# Patient Record
Sex: FEMALE | Race: WHITE | NOT HISPANIC OR LATINO | Employment: OTHER | URBAN - METROPOLITAN AREA
[De-identification: names, ages, dates, MRNs, and addresses within clinical notes are randomized per-mention and may not be internally consistent; named-entity substitution may affect disease eponyms.]

---

## 2019-05-24 PROBLEM — K50.90 CROHN DISEASE: Status: ACTIVE | Noted: 2019-04-04

## 2019-05-24 PROBLEM — E03.9 HYPOTHYROIDISM, ADULT: Status: ACTIVE | Noted: 2017-10-20

## 2019-05-24 PROBLEM — D64.9 ANEMIA: Status: ACTIVE | Noted: 2019-05-24

## 2019-05-24 PROBLEM — I10 HYPERTENSIVE DISORDER: Status: ACTIVE | Noted: 2019-05-24

## 2019-05-24 PROBLEM — R00.0 TACHYCARDIA: Status: ACTIVE | Noted: 2017-10-20

## 2019-05-24 PROBLEM — E66.01 MORBID OBESITY: Status: ACTIVE | Noted: 2019-05-24

## 2019-05-24 PROBLEM — Z79.01 ANTICOAGULATED ON COUMADIN: Status: ACTIVE | Noted: 2017-10-20

## 2019-05-24 PROBLEM — R11.0 NAUSEA: Status: ACTIVE | Noted: 2019-05-24

## 2019-05-24 PROBLEM — A60.00 GENITAL HERPES SIMPLEX: Status: ACTIVE | Noted: 2019-05-24

## 2019-05-24 PROBLEM — J30.9 ALLERGIC RHINITIS: Status: ACTIVE | Noted: 2019-05-24

## 2019-05-24 PROBLEM — R23.2 HOT FLASHES: Status: ACTIVE | Noted: 2019-05-24

## 2019-05-24 PROBLEM — K92.2 LOWER GI BLEED: Status: ACTIVE | Noted: 2019-05-24

## 2019-05-24 PROBLEM — B00.9 HERPES: Status: ACTIVE | Noted: 2017-10-20

## 2019-10-30 PROBLEM — A60.00 GENITAL HERPES SIMPLEX: Status: RESOLVED | Noted: 2019-05-24 | Resolved: 2019-10-30

## 2019-10-30 PROBLEM — R11.0 NAUSEA: Status: RESOLVED | Noted: 2019-05-24 | Resolved: 2019-10-30

## 2019-10-30 PROBLEM — N83.202 CYST OF LEFT OVARY: Status: ACTIVE | Noted: 2019-10-30

## 2019-10-30 PROBLEM — R00.0 TACHYCARDIA: Status: RESOLVED | Noted: 2017-10-20 | Resolved: 2019-10-30

## 2020-02-18 PROBLEM — I26.99 PULMONARY EMBOLISM: Status: ACTIVE | Noted: 2020-02-18

## 2020-02-18 PROBLEM — R74.8 ABNORMAL LIVER ENZYMES: Status: ACTIVE | Noted: 2020-02-18

## 2020-02-18 PROBLEM — D47.2 MONOCLONAL PARAPROTEINEMIA: Status: ACTIVE | Noted: 2020-02-18

## 2020-11-25 ENCOUNTER — SPECIALTY PHARMACY (OUTPATIENT)
Dept: PHARMACY | Facility: CLINIC | Age: 60
End: 2020-11-25

## 2020-11-25 DIAGNOSIS — K50.00 CROHN'S DISEASE OF SMALL INTESTINE WITHOUT COMPLICATION: Primary | ICD-10-CM

## 2020-12-10 ENCOUNTER — PATIENT MESSAGE (OUTPATIENT)
Dept: PHARMACY | Facility: CLINIC | Age: 60
End: 2020-12-10

## 2021-05-03 ENCOUNTER — SPECIALTY PHARMACY (OUTPATIENT)
Dept: PHARMACY | Facility: CLINIC | Age: 61
End: 2021-05-03

## 2021-05-03 DIAGNOSIS — K50.919 CROHN'S DISEASE WITH COMPLICATION, UNSPECIFIED GASTROINTESTINAL TRACT LOCATION: Primary | ICD-10-CM

## 2021-05-12 ENCOUNTER — PATIENT MESSAGE (OUTPATIENT)
Dept: RESEARCH | Facility: HOSPITAL | Age: 61
End: 2021-05-12

## 2021-06-25 ENCOUNTER — PATIENT MESSAGE (OUTPATIENT)
Dept: PHARMACY | Facility: CLINIC | Age: 61
End: 2021-06-25

## 2021-06-28 ENCOUNTER — SPECIALTY PHARMACY (OUTPATIENT)
Dept: PHARMACY | Facility: CLINIC | Age: 61
End: 2021-06-28

## 2021-08-20 ENCOUNTER — SPECIALTY PHARMACY (OUTPATIENT)
Dept: PHARMACY | Facility: CLINIC | Age: 61
End: 2021-08-20

## 2021-10-12 ENCOUNTER — SPECIALTY PHARMACY (OUTPATIENT)
Dept: PHARMACY | Facility: CLINIC | Age: 61
End: 2021-10-12

## 2021-12-08 ENCOUNTER — SPECIALTY PHARMACY (OUTPATIENT)
Dept: PHARMACY | Facility: CLINIC | Age: 61
End: 2021-12-08

## 2022-02-01 ENCOUNTER — SPECIALTY PHARMACY (OUTPATIENT)
Dept: PHARMACY | Facility: CLINIC | Age: 62
End: 2022-02-01

## 2022-02-01 NOTE — TELEPHONE ENCOUNTER
Specialty Pharmacy - Refill Coordination    Specialty Medication Orders Linked to Encounter    Flowsheet Row Most Recent Value   Medication #1 ustekinumab (STELARA) 90 mg/mL Syrg syringe (Order#[id number], Rx#[id number])          Refill Questions - Documented Responses    Flowsheet Row Most Recent Value   Patient Availability and HIPAA Verification    Does patient want to proceed with activity? Yes   HIPAA/medical authority confirmed? Yes   Relationship to patient of person spoken to? Self   Refill Screening Questions    Changes to allergies? No   Changes to medications? No   New conditions since last clinic visit? No   Unplanned office visit, urgent care, ED, or hospital admission in the last 4 weeks? No   How does patient/caregiver feel medication is working? Good   Financial problems or insurance changes? No   How many doses of your specialty medications were missed in the last 4 weeks? 0   Would patient like to speak to a pharmacist? No   When does the patient need to receive the medication? 02/08/22   Refill Delivery Questions    How will the patient receive the medication? Mail   When does the patient need to receive the medication? 02/08/22   Shipping Address Home   Address in [facility] confirmed and updated if neccessary? Yes   Expected Copay ($) 0   Is the patient able to afford the medication copay? Yes   Payment Method zero copay   Days supply of Refill 56   Supplies needed? No supplies needed   Refill activity completed? Yes   Refill activity plan Refill scheduled   Shipment/Pickup Date: 02/02/22          Current Outpatient Medications   Medication Sig    acyclovir (ZOVIRAX) 400 MG tablet Take 1 tablet (400 mg total) by mouth 2 (two) times daily.    acyclovir 5% (ZOVIRAX) 5 % ointment acyclovir 5 % topical ointment    atenoloL (TENORMIN) 25 MG tablet Take 1 tablet (25 mg total) by mouth once daily.    calcium-vitamin D3-vitamin K (VIACTIV) 650 mg-12.5 mcg-40 mcg Chew Take 1 tablet by mouth 2  (two) times a day.    diphenoxylate-atropine 2.5-0.025 mg (LOMOTIL) 2.5-0.025 mg per tablet as needed.    ferrous sulfate (FEOSOL) 325 mg (65 mg iron) Tab tablet Take 1 tablet (325 mg total) by mouth once daily.    fexofenadine (ALLEGRA ALLERGY) 180 MG tablet Allegra Allergy 180 mg tablet   Take 1 tablet every day by oral route. (Patient not taking: Reported on 1/24/2022)    fluticasone propionate (FLONASE) 50 mcg/actuation nasal spray fluticasone propionate 50 mcg/actuation nasal spray,suspension   INHALE 2 SPRAYS IN EACH NOSTRIL BY INTRANASAL ROUTE ONCE DAILY AS NEEDED    levothyroxine (SYNTHROID) 75 MCG tablet levothyroxine 75 mcg tablet  TAKE ONE TABLET BY MOUTH ONCE DAILY    loperamide (IMODIUM) 2 mg capsule Take 2 mg by mouth as needed.    LOVENOX 100 mg/mL Syrg Inject 1 syringe subcutaneously twice a day    pantoprazole (PROTONIX) 40 MG tablet Take 1 tablet (40 mg total) by mouth once daily.    paroxetine (PAXIL) 10 MG tablet paroxetine 10 mg tablet  TAKE ONE TABLET BY MOUTH DAILY    ustekinumab (STELARA) 90 mg/mL Syrg syringe Inject 1 mL (90 mg total) into the skin every 8 weeks.    warfarin (COUMADIN) 6 MG tablet warfarin 6 mg tablet   SUNDAY-6MG,MONDAY-9MG,TUES-9MG,WED-6MG,THURS-9MG,FRI-6MG,SAT-9MG   Last reviewed on 1/24/2022  8:58 AM by Ileana Luu LPN    Review of patient's allergies indicates:   Allergen Reactions    Penicillins     Sulfasalazine     Last reviewed on  1/26/2022 10:03 AM by Richie Gentile      Tasks added this encounter   No tasks added.   Tasks due within next 3 months   2/1/2022 - Refill Call (Auto Added)     Micha Almazan, PharmD  [facility]  [address] [place] [id number]  Phone: [phone]  Fax: [phone]

## 2022-03-28 ENCOUNTER — SPECIALTY PHARMACY (OUTPATIENT)
Dept: PHARMACY | Facility: CLINIC | Age: 62
End: 2022-03-28

## 2022-03-28 NOTE — TELEPHONE ENCOUNTER
Called patient for Stelara refill. Patient due to inject 4/5. Requires PA. Routing to [facility]. Patient is aware we will reach out once determination is made.

## 2022-04-04 NOTE — TELEPHONE ENCOUNTER
Incoming call from patient looking for update on Stelara. Informed her PA is being worked on and we will reach out when we have an update. Escalated to [facility] and team.

## 2022-04-04 NOTE — TELEPHONE ENCOUNTER
PAP re-enrollment - accepted by patient     Mailed pt portion  Faxed provider portion to [facility] and sent doc message of its arrival.     Will follow up on 4/11

## 2022-04-04 NOTE — TELEPHONE ENCOUNTER
PA submitted to [facility] 4/4. Stelara approved, but copay $3561.32. Forwarding to financial assistance in order to renew PAP application

## 2022-04-11 NOTE — TELEPHONE ENCOUNTER
Outpatient call- success    Patient stated she sent back PAP via priority mail    Will update once received.

## 2022-04-18 ENCOUNTER — PATIENT MESSAGE (OUTPATIENT)
Dept: ADMINISTRATIVE | Facility: OTHER | Age: 62
End: 2022-04-18

## 2022-04-18 NOTE — TELEPHONE ENCOUNTER
Outgoing call to patient:    No answer, we are still waiting for her patient portion of PAP for Stelara, following up with the patient to ensure she did mail her portion of PAP to us.

## 2022-04-19 ENCOUNTER — SPECIALTY PHARMACY (OUTPATIENT)
Dept: PHARMACY | Facility: CLINIC | Age: 62
End: 2022-04-19

## 2022-04-19 NOTE — TELEPHONE ENCOUNTER
Received patient PAP for Stelara is submitted [facility] at 08:52 am, stat check 04/21.

## 2022-04-19 NOTE — TELEPHONE ENCOUNTER
Incoming call regarding Stelara refill. Pt stated that she sent back her portion for PAP and formerly [facility] is submitting it today cause she just received it. Pt was due for her injection on April 11th. Will follow up with pt when PAP is approved.

## 2022-04-21 NOTE — TELEPHONE ENCOUNTER
Per Ashly [facility] for Stelara, patient must complete appeal letter and send back, once received , it will be approved in 24-48 hrs.    Emailed patient appeal [email or web address] , will continue to follow up.

## 2022-04-26 NOTE — TELEPHONE ENCOUNTER
Patient is approved for [facility] Patient Assistance Program as of 04/26/22  through 12/31/22. Scanned into [employer].    FOR DOCUMENTATION ONLY:  Financial Assistance for Stelara   Source: [facility]  Case ID: [id number]  Phone: [phone]

## 2022-05-14 PROBLEM — K92.2 LOWER GI BLEED: Status: RESOLVED | Noted: 2019-05-24 | Resolved: 2022-05-14

## 2022-06-10 ENCOUNTER — SPECIALTY PHARMACY (OUTPATIENT)
Dept: PHARMACY | Facility: CLINIC | Age: 62
End: 2022-06-10

## 2022-06-10 NOTE — TELEPHONE ENCOUNTER
Specialty Pharmacy - Clinical Reassessment    Specialty Medication Orders Linked to Encounter    Flowsheet Row Most Recent Value   Medication #1 ustekinumab (STELARA) 90 mg/mL Syrg syringe (Order#[id number], Rx#[id number])        Patient Diagnosis   K50.90 - Crohn disease    Specialty clinical pharmacist review completed for an annual review of reassessment. Reviewed the following areas: current med list, reports of adverse effects, adherence and progress towards therapeutic goals.    Recommendations: none at this time.   Patient had a slight delay in dose due to authorization/PAP renewal.    Tasks added this encounter   3/10/2023 - Clinical - Follow Up Assesement (Annual)   Tasks due within next 3 months   6/13/2022 - Refill Call (Auto Added)     Dora Spencer, PharmD  [facility]  [address] [place] [id number]  Phone: [phone]  Fax: [phone]

## 2022-06-13 ENCOUNTER — SPECIALTY PHARMACY (OUTPATIENT)
Dept: PHARMACY | Facility: CLINIC | Age: 62
End: 2022-06-13

## 2022-06-13 NOTE — TELEPHONE ENCOUNTER
Specialty Pharmacy - Refill Coordination    Specialty Medication Orders Linked to Encounter    Flowsheet Row Most Recent Value   Medication #1 ustekinumab (STELARA) 90 mg/mL Syrg syringe (Order#[id number], Rx#[id number])        Patient had bleeding after most recent colonoscopy, but was seen by GI provider. Plan is to continue at every 8 week dosing interval.    Refill Questions - Documented Responses    Flowsheet Row Most Recent Value   Patient Availability and HIPAA Verification    Does patient want to proceed with activity? Yes   HIPAA/medical authority confirmed? Yes   Relationship to patient of person spoken to? Self   Refill Screening Questions    Changes to allergies? No   Changes to medications? No   New conditions since last clinic visit? No   Unplanned office visit, urgent care, ED, or hospital admission in the last 4 weeks? No   How does patient/caregiver feel medication is working? Good   Financial problems or insurance changes? No   How many doses of your specialty medications were missed in the last 4 weeks? 0   Would patient like to speak to a pharmacist? No   When does the patient need to receive the medication? 06/22/22   Refill Delivery Questions    How will the patient receive the medication? Mail   When does the patient need to receive the medication? 06/22/22   Shipping Address Home   Address in [facility] confirmed and updated if neccessary? Yes   Expected Copay ($) 0   Is the patient able to afford the medication copay? Yes   Payment Method zero copay   Days supply of Refill 56   Supplies needed? No supplies needed   Refill activity completed? Yes   Refill activity plan Refill scheduled   Shipment/Pickup Date: 06/21/22          Current Outpatient Medications   Medication Sig    acyclovir (ZOVIRAX) 400 MG tablet Take 1 tablet (400 mg total) by mouth 2 (two) times daily.    acyclovir 5% (ZOVIRAX) 5 % ointment acyclovir 5 % topical ointment (Patient taking differently: as needed.  acyclovir 5 % topical ointment)    atenoloL (TENORMIN) 25 MG tablet Take 1 tablet (25 mg total) by mouth once daily.    calcium-vitamin D3-vitamin K (VIACTIV) 650 mg-12.5 mcg-40 mcg Chew Take 1 tablet by mouth 2 (two) times a day.    ferrous sulfate (FEOSOL) 325 mg (65 mg iron) Tab tablet Take 1 tablet (325 mg total) by mouth once daily.    fexofenadine (ALLEGRA ALLERGY) 180 MG tablet Allegra Allergy 180 mg tablet   Take 1 tablet every day by oral route.    fluticasone propionate (FLONASE) 50 mcg/actuation nasal spray fluticasone propionate 50 mcg/actuation nasal spray,suspension   INHALE 2 SPRAYS IN EACH NOSTRIL BY INTRANASAL ROUTE ONCE DAILY AS NEEDED    levothyroxine (SYNTHROID) 75 MCG tablet levothyroxine 75 mcg tablet  TAKE ONE TABLET BY MOUTH ONCE DAILY    LOVENOX 100 mg/mL Syrg Inject 1 syringe subcutaneously twice a day    pantoprazole (PROTONIX) 40 MG tablet Take 1 tablet (40 mg total) by mouth once daily.    paroxetine (PAXIL) 10 MG tablet paroxetine 10 mg tablet  TAKE ONE TABLET BY MOUTH DAILY    ustekinumab (STELARA) 90 mg/mL Syrg syringe Inject 1 mL (90 mg total) into the skin every 8 weeks.    warfarin (COUMADIN) 6 MG tablet warfarin 6 mg tablet   SUNDAY-6MG,MONDAY-9MG,TUES-9MG,WED-6MG,THURS-9MG,FRI-6MG,SAT-9MG   Last reviewed on 5/27/2022  1:42 PM by Marti Michelle MA    Review of patient's allergies indicates:   Allergen Reactions    Penicillins     Sulfasalazine     Last reviewed on  5/27/2022 1:42 PM by Marti Michelle      Tasks added this encounter   8/5/2022 - Refill Call (Auto Added)   Tasks due within next 3 months   No tasks due.     Dora Spencer, PharmD  [facility]  [address] [place] [id number]  Phone: [phone]  Fax: [phone]

## 2022-08-05 ENCOUNTER — SPECIALTY PHARMACY (OUTPATIENT)
Dept: PHARMACY | Facility: CLINIC | Age: 62
End: 2022-08-05

## 2022-08-05 NOTE — TELEPHONE ENCOUNTER
Outgoing call regarding stelara refill, pt reports next injection date 8/17, pending call closer to fill date

## 2022-08-09 NOTE — TELEPHONE ENCOUNTER
Specialty Pharmacy - Refill Coordination    Specialty Medication Orders Linked to Encounter    Flowsheet Row Most Recent Value   Medication #1 ustekinumab (STELARA) 90 mg/mL Syrg syringe (Order#[id number], Rx#[id number])          Refill Questions - Documented Responses    Flowsheet Row Most Recent Value   Patient Availability and HIPAA Verification    Does patient want to proceed with activity? Yes   HIPAA/medical authority confirmed? Yes   Relationship to patient of person spoken to? Self   Refill Screening Questions    Changes to allergies? No   Changes to medications? No   New conditions since last clinic visit? No   Unplanned office visit, urgent care, ED, or hospital admission in the last 4 weeks? No   How does patient/caregiver feel medication is working? Good   Financial problems or insurance changes? No   How many doses of your specialty medications were missed in the last 4 weeks? 0   Would patient like to speak to a pharmacist? No   When does the patient need to receive the medication? 08/18/22   Refill Delivery Questions    How will the patient receive the medication? Mail   When does the patient need to receive the medication? 08/18/22   Shipping Address Home   Address in [facility] confirmed and updated if neccessary? Yes   Expected Copay ($) 0   Is the patient able to afford the medication copay? Yes   Payment Method zero copay   Days supply of Refill 56   Supplies needed? No supplies needed   Refill activity completed? Yes   Refill activity plan Refill scheduled   Shipment/Pickup Date: 08/17/22          Current Outpatient Medications   Medication Sig    acyclovir (ZOVIRAX) 400 MG tablet Take 1 tablet (400 mg total) by mouth 2 (two) times daily.    acyclovir 5% (ZOVIRAX) 5 % ointment acyclovir 5 % topical ointment (Patient taking differently: as needed. acyclovir 5 % topical ointment)    atenoloL (TENORMIN) 25 MG tablet Take 1 tablet (25 mg total) by mouth once daily.    calcium-vitamin  D3-vitamin K (VIACTIV) 650 mg-12.5 mcg-40 mcg Chew Take 1 tablet by mouth 2 (two) times a day.    ciprofloxacin HCl (CIPRO) 500 MG tablet Take 1 tablet (500 mg total) by mouth every 12 (twelve) hours.    clotrimazole (LOTRIMIN) 1 % cream Apply topically 2 (two) times daily.    diphenoxylate-atropine 2.5-0.025 mg (LOMOTIL) 2.5-0.025 mg per tablet Take 1 tablet by mouth 4 (four) times daily as needed for Diarrhea.    ferrous sulfate (FEOSOL) 325 mg (65 mg iron) Tab tablet Take 1 tablet (325 mg total) by mouth once daily.    ferrous sulfate 325 (65 FE) MG EC tablet Take 1 tablet (325 mg total) by mouth 3 (three) times daily with meals.    fexofenadine (ALLEGRA ALLERGY) 180 MG tablet Allegra Allergy 180 mg tablet   Take 1 tablet every day by oral route.    fluticasone propionate (FLONASE) 50 mcg/actuation nasal spray fluticasone propionate 50 mcg/actuation nasal spray,suspension   INHALE 2 SPRAYS IN EACH NOSTRIL BY INTRANASAL ROUTE ONCE DAILY AS NEEDED    levothyroxine (SYNTHROID) 75 MCG tablet levothyroxine 75 mcg tablet  TAKE ONE TABLET BY MOUTH ONCE DAILY    levothyroxine (SYNTHROID) 75 MCG tablet Take 1 tablet (75 mcg total) by mouth before breakfast. Take first thing in the morning, at least 30 minutes but preferably an hour before eating, on an empty stomach and with only water.    loperamide (IMODIUM) 2 mg capsule Take 2 mg by mouth 4 (four) times daily as needed for Diarrhea.    LOVENOX 100 mg/mL Syrg Inject 1 syringe subcutaneously twice a day    pantoprazole (PROTONIX) 40 MG tablet Take 1 tablet (40 mg total) by mouth once daily.    pantoprazole (PROTONIX) 40 MG tablet Take 1 tablet (40 mg total) by mouth once daily.    paroxetine (PAXIL) 10 MG tablet paroxetine 10 mg tablet  TAKE ONE TABLET BY MOUTH DAILY    paroxetine (PAXIL) 10 MG tablet Take 1 tablet (10 mg total) by mouth every morning.    ustekinumab (STELARA) 90 mg/mL Syrg syringe Inject 1 mL (90 mg total) into the skin every 8 weeks.     warfarin (COUMADIN) 6 MG tablet warfarin 6 mg tablet   SUNDAY-6MG,MONDAY-9MG,TUES-9MG,WED-6MG,THURS-9MG,FRI-6MG,SAT-9MG    warfarin (COUMADIN) 6 MG tablet Take 1 tablet (6 mg total) by mouth Daily.   Last reviewed on 6/30/2022 11:33 AM by Cash Hudson MA    Review of patient's allergies indicates:   Allergen Reactions    Penicillins     Sulfasalazine     Last reviewed on  6/30/2022 11:28 AM by Cash Hudson      Tasks added this encounter   10/1/2022 - Refill Call (Auto Added)   Tasks due within next 3 months   No tasks due.     [facility]  [address] [facility] [address]  Phone: [phone]  Fax: [phone]

## 2022-10-06 ENCOUNTER — SPECIALTY PHARMACY (OUTPATIENT)
Dept: PHARMACY | Facility: CLINIC | Age: 62
End: 2022-10-06

## 2022-10-06 NOTE — TELEPHONE ENCOUNTER
Outgoing call regarding stelara refill; per pt, her last injection was 9/2 and she's not due until 10/28; informed her that [facility] will follow up on 10/21 to schedule delivery

## 2022-10-21 NOTE — TELEPHONE ENCOUNTER
Specialty Pharmacy - Refill Coordination    Specialty Medication Orders Linked to Encounter      Flowsheet Row Most Recent Value   Medication #1 ustekinumab (STELARA) 90 mg/mL Syrg syringe (Order#[id number], Rx#[id number])            Refill Questions - Documented Responses      Flowsheet Row Most Recent Value   Patient Availability and HIPAA Verification    Does patient want to proceed with activity? Yes   HIPAA/medical authority confirmed? Yes   Relationship to patient of person spoken to? Self   Refill Screening Questions    Changes to allergies? No   Changes to medications? No   New conditions since last clinic visit? No   Unplanned office visit, urgent care, ED, or hospital admission in the last 4 weeks? No   How does patient/caregiver feel medication is working? Good   Financial problems or insurance changes? No   How many doses of your specialty medications were missed in the last 4 weeks? 0   Would patient like to speak to a pharmacist? No   When does the patient need to receive the medication? 10/28/22   Refill Delivery Questions    How will the patient receive the medication? Mail   When does the patient need to receive the medication? 10/28/22   Shipping Address Home   Address in [facility] confirmed and updated if neccessary? Yes   Expected Copay ($) 0   Is the patient able to afford the medication copay? Yes   Payment Method zero copay   Days supply of Refill 56   Supplies needed? No supplies needed   Refill activity completed? Yes   Refill activity plan Refill scheduled   Shipment/Pickup Date: 10/24/22            Current Outpatient Medications   Medication Sig    acyclovir (ZOVIRAX) 400 MG tablet Take 1 tablet (400 mg total) by mouth 2 (two) times daily.    acyclovir 5% (ZOVIRAX) 5 % ointment acyclovir 5 % topical ointment (Patient taking differently: as needed. acyclovir 5 % topical ointment)    atenoloL (TENORMIN) 25 MG tablet Take 1 tablet (25 mg total) by mouth once daily.    calcium-vitamin  D3-vitamin K (VIACTIV) 650 mg-12.5 mcg-40 mcg Chew Take 1 tablet by mouth 2 (two) times a day.    ciprofloxacin HCl (CIPRO) 500 MG tablet Take 1 tablet (500 mg total) by mouth every 12 (twelve) hours.    clotrimazole (LOTRIMIN) 1 % cream Apply topically 2 (two) times daily.    diphenoxylate-atropine 2.5-0.025 mg (LOMOTIL) 2.5-0.025 mg per tablet Take 1 tablet by mouth 4 (four) times daily as needed for Diarrhea.    ferrous sulfate (FEOSOL) 325 mg (65 mg iron) Tab tablet Take 1 tablet (325 mg total) by mouth once daily.    ferrous sulfate 325 (65 FE) MG EC tablet Take 1 tablet (325 mg total) by mouth 3 (three) times daily with meals.    fexofenadine (ALLEGRA ALLERGY) 180 MG tablet Allegra Allergy 180 mg tablet   Take 1 tablet every day by oral route.    fluticasone propionate (FLONASE) 50 mcg/actuation nasal spray fluticasone propionate 50 mcg/actuation nasal spray,suspension   INHALE 2 SPRAYS IN EACH NOSTRIL BY INTRANASAL ROUTE ONCE DAILY AS NEEDED    levothyroxine (SYNTHROID) 75 MCG tablet levothyroxine 75 mcg tablet  TAKE ONE TABLET BY MOUTH ONCE DAILY    levothyroxine (SYNTHROID) 75 MCG tablet Take 1 tablet (75 mcg total) by mouth before breakfast. Take first thing in the morning, at least 30 minutes but preferably an hour before eating, on an empty stomach and with only water.    loperamide (IMODIUM) 2 mg capsule Take 2 mg by mouth 4 (four) times daily as needed for Diarrhea.    LOVENOX 100 mg/mL Syrg Inject 1 syringe subcutaneously twice a day    pantoprazole (PROTONIX) 40 MG tablet Take 1 tablet (40 mg total) by mouth once daily.    pantoprazole (PROTONIX) 40 MG tablet Take 1 tablet (40 mg total) by mouth once daily.    paroxetine (PAXIL) 10 MG tablet paroxetine 10 mg tablet  TAKE ONE TABLET BY MOUTH DAILY    paroxetine (PAXIL) 10 MG tablet Take 1 tablet (10 mg total) by mouth every morning.    ustekinumab (STELARA) 90 mg/mL Syrg syringe Inject 1 mL (90 mg total) into the skin every 8 weeks.    warfarin  (COUMADIN) 6 MG tablet warfarin 6 mg tablet   SUNDAY-6MG,MONDAY-9MG,TUES-9MG,WED-6MG,THURS-9MG,FRI-6MG,SAT-9MG    warfarin (COUMADIN) 6 MG tablet Take 1 tablet (6 mg total) by mouth Daily.   Last reviewed on 6/30/2022 11:33 AM by Cash Hudson MA    Review of patient's allergies indicates:   Allergen Reactions    Penicillins     Sulfasalazine     Last reviewed on  9/21/2022 9:45 AM by Tamela Moore      Tasks added this encounter   12/13/2022 - Refill Call (Auto Added)   Tasks due within next 3 months   No tasks due.     Dominga Tenorio, PharmD  [facility]  [address] [facility] [address]  Phone: [phone]  Fax: [phone]

## 2022-12-13 ENCOUNTER — SPECIALTY PHARMACY (OUTPATIENT)
Dept: PHARMACY | Facility: CLINIC | Age: 62
End: 2022-12-13

## 2022-12-13 NOTE — TELEPHONE ENCOUNTER
Outgoing call: Patient is due to inject on 12/20. I informed her that a refill request was sent to her doctor and once approved [facility] will follow up.

## 2022-12-23 NOTE — TELEPHONE ENCOUNTER
Staffed message Dr Anne in regards to refill, patient is concerned she will begin flaring. Informed patient we will continue to request refill until it is approved.    Outgoing call to clinic, tried to call clinic 3 times but only a busy signal. Informed patient to try and contact provider as well.

## 2022-12-27 NOTE — TELEPHONE ENCOUNTER
Specialty Pharmacy - Refill Coordination    Specialty Medication Orders Linked to Encounter      Flowsheet Row Most Recent Value   Medication #1 ustekinumab (STELARA) 90 mg/mL Syrg syringe (Order#[id number], Rx#[id number])            Refill Questions - Documented Responses      Flowsheet Row Most Recent Value   Patient Availability and HIPAA Verification    Does patient want to proceed with activity? Yes   HIPAA/medical authority confirmed? Yes   Relationship to patient of person spoken to? Self   Refill Screening Questions    Changes to allergies? No   Changes to medications? No   New conditions since last clinic visit? No   Unplanned office visit, urgent care, ED, or hospital admission in the last 4 weeks? No   How does patient/caregiver feel medication is working? Good   Financial problems or insurance changes? No   How many doses of your specialty medications were missed in the last 4 weeks? 0   Would patient like to speak to a pharmacist? No   When does the patient need to receive the medication? 12/30/22   Refill Delivery Questions    How will the patient receive the medication? Mail   When does the patient need to receive the medication? 12/30/22   Shipping Address Home   Address in [facility] confirmed and updated if neccessary? Yes   Expected Copay ($) 0   Is the patient able to afford the medication copay? Yes   Payment Method zero copay   Days supply of Refill 56   Supplies needed? No supplies needed   Refill activity completed? Yes   Refill activity plan Refill scheduled   Shipment/Pickup Date: 12/29/22            Current Outpatient Medications   Medication Sig    acyclovir (ZOVIRAX) 400 MG tablet Take 1 tablet (400 mg total) by mouth 2 (two) times daily.    acyclovir 5% (ZOVIRAX) 5 % ointment acyclovir 5 % topical ointment (Patient taking differently: as needed. acyclovir 5 % topical ointment)    atenoloL (TENORMIN) 25 MG tablet Take 1 tablet (25 mg total) by mouth once daily.    calcium-vitamin  D3-vitamin K (VIACTIV) 650 mg-12.5 mcg-40 mcg Chew Take 1 tablet by mouth 2 (two) times a day.    ciprofloxacin HCl (CIPRO) 500 MG tablet Take 1 tablet (500 mg total) by mouth every 12 (twelve) hours.    clotrimazole (LOTRIMIN) 1 % cream Apply topically 2 (two) times daily.    diphenoxylate-atropine 2.5-0.025 mg (LOMOTIL) 2.5-0.025 mg per tablet Take 1 tablet by mouth 4 (four) times daily as needed for Diarrhea.    ferrous sulfate (FEOSOL) 325 mg (65 mg iron) Tab tablet Take 1 tablet (325 mg total) by mouth once daily.    ferrous sulfate 325 (65 FE) MG EC tablet Take 1 tablet (325 mg total) by mouth 3 (three) times daily with meals.    fexofenadine (ALLEGRA ALLERGY) 180 MG tablet Allegra Allergy 180 mg tablet   Take 1 tablet every day by oral route.    fluticasone propionate (FLONASE) 50 mcg/actuation nasal spray fluticasone propionate 50 mcg/actuation nasal spray,suspension   INHALE 2 SPRAYS IN EACH NOSTRIL BY INTRANASAL ROUTE ONCE DAILY AS NEEDED    levothyroxine (SYNTHROID) 75 MCG tablet levothyroxine 75 mcg tablet  TAKE ONE TABLET BY MOUTH ONCE DAILY    levothyroxine (SYNTHROID) 75 MCG tablet Take 1 tablet (75 mcg total) by mouth before breakfast. Take first thing in the morning, at least 30 minutes but preferably an hour before eating, on an empty stomach and with only water.    loperamide (IMODIUM) 2 mg capsule Take 2 mg by mouth 4 (four) times daily as needed for Diarrhea.    LOVENOX 100 mg/mL Syrg Inject 1 syringe subcutaneously twice a day    pantoprazole (PROTONIX) 40 MG tablet Take 1 tablet (40 mg total) by mouth once daily.    pantoprazole (PROTONIX) 40 MG tablet Take 1 tablet (40 mg total) by mouth once daily.    paroxetine (PAXIL) 10 MG tablet paroxetine 10 mg tablet  TAKE ONE TABLET BY MOUTH DAILY    paroxetine (PAXIL) 10 MG tablet Take 1 tablet (10 mg total) by mouth every morning.    ustekinumab (STELARA) 90 mg/mL Syrg syringe Inject 1 mL (90 mg total) into the skin every 8 weeks.    warfarin  (COUMADIN) 6 MG tablet warfarin 6 mg tablet   SUNDAY-6MG,MONDAY-9MG,TUES-9MG,WED-6MG,THURS-9MG,FRI-6MG,SAT-9MG    warfarin (COUMADIN) 6 MG tablet Take 1 tablet (6 mg total) by mouth Daily.   Last reviewed on 10/26/2022  1:33 PM by Marti Michelle MA    Review of patient's allergies indicates:   Allergen Reactions    Penicillins     Sulfasalazine     Last reviewed on  10/26/2022 1:31 PM by Marti Michelle    Interventions added this encounter   Closed: [facility] Patient Intervention - Drug therapy effectiveness: ustekinumab (STELARA) 90 mg/mL Syrg syringe     Tasks added this encounter   2/13/2023 - Refill Call (Auto Added)   Tasks due within next 3 months   3/10/2023 - Clinical - Follow Up Assesement (Annual)     Dominga Tenorio, PharmD  [facility]  [address]  [place] [address]  Phone: [phone]  Fax: [phone]

## 2023-02-07 ENCOUNTER — SPECIALTY PHARMACY (OUTPATIENT)
Dept: PHARMACY | Facility: CLINIC | Age: 63
End: 2023-02-07

## 2023-02-07 NOTE — TELEPHONE ENCOUNTER
Outgoing call to patient, to confirm patient is continuing Stelara therapy and if [facility] can send patient [employer] application for FA. Unable to reach , unable to LVM.

## 2023-02-13 ENCOUNTER — SPECIALTY PHARMACY (OUTPATIENT)
Dept: PHARMACY | Facility: CLINIC | Age: 63
End: 2023-02-13

## 2023-02-13 NOTE — TELEPHONE ENCOUNTER
Outgoing call to pt regarding stelara refill. Pt last injection 12/30, so she is due to inject 2/24. Pt would like to continue Stelara therapy and would like [employer] application for FA mailed to her house. Confirmed address on file. Routing to [facility]. Will follow up.

## 2023-02-14 NOTE — TELEPHONE ENCOUNTER
Mailed patient application, staff messaged [facility] and faxed provider portion, will continue to follow up.

## 2023-02-14 NOTE — TELEPHONE ENCOUNTER
Opened order set to work on Stelara FA. Patient would like application mailed, dose due on 2/24/23.

## 2023-02-15 NOTE — TELEPHONE ENCOUNTER
Incoming call from [facility] provided fax of [phone] to fax PAP, call back number for provider is [phone] for any additional info needed. Faxed provider portion to provider. Will follow up.

## 2023-02-20 NOTE — TELEPHONE ENCOUNTER
Outgoing call to patient , unable to reach, following up with patient to see if patient has received PAP forms. Will continue to follow up.

## 2023-02-23 ENCOUNTER — PATIENT MESSAGE (OUTPATIENT)
Dept: PHARMACY | Facility: CLINIC | Age: 63
End: 2023-02-23

## 2023-02-23 NOTE — TELEPHONE ENCOUNTER
Outgoing call to patient, patient is due for Stelara dose 2/24/23 but misplaced Stelara application.    Resent application to patient email address, informed patient to request EOB from insurance. Patient expressed understanding, will continue to follow up.    Resent provider portion.   No

## 2023-02-23 NOTE — TELEPHONE ENCOUNTER
Incoming call from patient for Stelara refill due 2/24. Informed patient [facility] is unable to process Stelara under [employer] at zero charge due to expiration. Patient must complete [facility] application and send back to [facility]. Patient asked that application be mailed again because she may have misplaced it. Alerted assigned [facility].

## 2023-02-27 NOTE — TELEPHONE ENCOUNTER
Patient portion received without EOB or expenses , requested info from patient. Still pending provider's portion.    PA from [facility] approved from 01/01/23 to 12/31/23, PA case [id number].

## 2023-02-28 NOTE — TELEPHONE ENCOUNTER
Outgoing call patient, patient needs to request EOB of 2022 or provide an expense report from the pharmacy, so [facility] is able to submit to [place].    Still pending provider's portion , resending staff message.

## 2023-03-06 NOTE — TELEPHONE ENCOUNTER
Outgoing call to patient, patient is still working on EOB, received provider porting faxing application to [employer]. Will continue to follow up.

## 2023-03-16 NOTE — TELEPHONE ENCOUNTER
Outgoing call to patient, patient unable to retrieve EOB for 2022. Outgoing call to [facility] , [phone], rep will mail out expense report for 2022.     Outgoing call to patient, informed patient to scan documents and return to [facility] email when available. Patient expressed understanding.

## 2023-03-22 NOTE — TELEPHONE ENCOUNTER
Pharmacy reports received, faxed application and information to [facility] patient assistance, will continue to follow up.

## 2023-03-29 NOTE — TELEPHONE ENCOUNTER
Spoke with , Chitra, who informed me they do not have a copy of the PA approval letter. Faxing missing information as urgent, ATTN: Chitra to program fax: [phone]

## 2023-03-30 NOTE — TELEPHONE ENCOUNTER
Faxed PA letter to [phone] at [facility] [place], will continue to follow up.

## 2023-04-12 NOTE — TELEPHONE ENCOUNTER
Outgoing call to patient, per patient has not heard from [employer] or [facility]. Informed patient [facility] will continue to follow up with [facility] escalation team.

## 2023-04-18 ENCOUNTER — SPECIALTY PHARMACY (OUTPATIENT)
Dept: PHARMACY | Facility: CLINIC | Age: 63
End: 2023-04-18

## 2023-04-18 DIAGNOSIS — K50.919 CROHN'S DISEASE WITH COMPLICATION, UNSPECIFIED GASTROINTESTINAL TRACT LOCATION: Primary | ICD-10-CM

## 2023-04-18 RX ORDER — CALCIUM CARBONATE 500(1250)
1 TABLET ORAL DAILY
COMMUNITY

## 2023-04-18 NOTE — TELEPHONE ENCOUNTER
Outgoing call for follow-up. Confirmed with patient she has been approved for PAP and has been getting her Stelara through them. Ok to de-enroll from [facility].

## 2023-04-18 NOTE — TELEPHONE ENCOUNTER
Patient has confirmed approval with [facility] patient assistance, closing referral at [facility].

## 2023-04-18 NOTE — TELEPHONE ENCOUNTER
Patient Diagnosis   K50.90 - Crohn disease    Ronda Crandall is a 62 y.o. female, who is followed by the specialty pharmacy service for management and education of her Stelara.  She has been on therapy with Stelara for since 5/2021.  I have reviewed her electronic medical record and current medication list and determined that specialty medication adjustment Is not needed at this time.    Patient has not experienced adverse events.  She Is adherent reporting missed 0 doses since last review. However,  Patient is currently getting the therapy through the  patient assistance program.  Adherence has been encouraged with the following mechanism(s): PAP program.  She is on target to meet goals of therapy and will continue treatment.         12/27/2022 10/21/2022 8/9/2022 6/13/2022 4/26/2022 2/1/2022 12/10/2021   Follow Up Review   # of missed doses 0 0 0 0    0 1 0 0   Reason     Other (comment)     New Medications? No    No No No No    No No No No   New Conditions? No    No No No No    No No No No   New Allergies? No    No No No No    No No No No   Med Effective? Good    Good Good Good Good    Good Very good Good Good   Urgent Care? No    No No No No    No No No No   Requested Pharmacist? No    No No No No    No No No No       Multiple values from one day are sorted in reverse-chronological order         Therapy is appropriate to continue.    Therapy is effective: Yes  On scale of 1 to 10, how does patient rank quality of life? (10 - Best): Unable to Assess  Recommendations: none at this time.  Review Method: Patient Contact    Tasks added this encounter   No tasks added.   Tasks due within next 3 months   3/10/2023 - Clinical - Follow Up Assesement (Annual)  4/16/2023 - Refill Call (Auto Added)     Jona Haney, PharmD  [facility]  [address]  [facility] [address]  Phone: [phone]  Fax: [phone]

## 2023-05-04 PROBLEM — B00.9 HERPES: Status: RESOLVED | Noted: 2017-10-20 | Resolved: 2023-05-04

## 2023-05-04 PROBLEM — R23.2 HOT FLASHES: Status: RESOLVED | Noted: 2019-05-24 | Resolved: 2023-05-04

## 2023-05-04 PROBLEM — D84.821 DRUG-INDUCED IMMUNODEFICIENCY: Status: ACTIVE | Noted: 2023-05-04

## 2023-05-04 PROBLEM — Z79.899 DRUG-INDUCED IMMUNODEFICIENCY: Status: ACTIVE | Noted: 2023-05-04

## 2024-02-15 PROBLEM — K63.5 POLYP OF COLON: Status: ACTIVE | Noted: 2024-02-15
